# Patient Record
Sex: MALE | Race: BLACK OR AFRICAN AMERICAN | NOT HISPANIC OR LATINO | Employment: UNEMPLOYED | URBAN - METROPOLITAN AREA
[De-identification: names, ages, dates, MRNs, and addresses within clinical notes are randomized per-mention and may not be internally consistent; named-entity substitution may affect disease eponyms.]

---

## 2019-03-14 ENCOUNTER — HOSPITAL ENCOUNTER (EMERGENCY)
Facility: OTHER | Age: 24
Discharge: HOME | End: 2019-03-14
Attending: EMERGENCY MEDICINE
Payer: MEDICAID

## 2019-03-14 VITALS
TEMPERATURE: 98 F | HEART RATE: 60 BPM | OXYGEN SATURATION: 97 % | WEIGHT: 200 LBS | RESPIRATION RATE: 18 BRPM | HEIGHT: 69 IN | SYSTOLIC BLOOD PRESSURE: 121 MMHG | DIASTOLIC BLOOD PRESSURE: 56 MMHG | BODY MASS INDEX: 29.62 KG/M2

## 2019-03-14 DIAGNOSIS — S76.211A STRAIN OF GROIN, RIGHT, INITIAL ENCOUNTER: Primary | ICD-10-CM

## 2019-03-14 DIAGNOSIS — N50.819 TESTICLE PAIN: ICD-10-CM

## 2019-03-14 LAB
BILIRUB UR QL STRIP: NEGATIVE
CLARITY UR: ABNORMAL
COLOR UR: YELLOW
GLUCOSE UR QL STRIP: NEGATIVE
HGB UR QL STRIP: NEGATIVE
KETONES UR QL STRIP: NEGATIVE
LEUKOCYTE ESTERASE UR QL STRIP: NEGATIVE
NITRITE UR QL STRIP: NEGATIVE
PH UR STRIP: 6 [PH] (ref 5–8)
PROT UR QL STRIP: NEGATIVE
SP GR UR STRIP: 1.02 (ref 1–1.03)
URN SPEC COLLECT METH UR: ABNORMAL
UROBILINOGEN UR STRIP-ACNC: NEGATIVE EU/DL

## 2019-03-14 PROCEDURE — 99284 EMERGENCY DEPT VISIT MOD MDM: CPT

## 2019-03-14 PROCEDURE — 81003 URINALYSIS AUTO W/O SCOPE: CPT

## 2019-03-14 RX ORDER — METHOCARBAMOL 500 MG/1
1000 TABLET, FILM COATED ORAL 3 TIMES DAILY PRN
Qty: 20 TABLET | Refills: 0 | Status: SHIPPED | OUTPATIENT
Start: 2019-03-14 | End: 2019-03-19

## 2019-03-14 RX ORDER — IBUPROFEN 800 MG/1
800 TABLET ORAL EVERY 6 HOURS PRN
Qty: 30 TABLET | Refills: 0 | Status: SHIPPED | OUTPATIENT
Start: 2019-03-14

## 2019-03-15 NOTE — ED TRIAGE NOTES
PT w/ right testicle pain x 2 days, denies recent injury, denies discharge from penis. Pt is AAOx 3, answers questions appropriately

## 2019-03-15 NOTE — ED PROVIDER NOTES
Encounter Date: 3/14/2019    SCRIBE #1 NOTE: I, Leon Garcia, am scribing for, and in the presence of, Dr. Burnham.       History     Chief Complaint   Patient presents with    Testicle Pain     c/o right testicle pain radiating to right groin to right lower extremity x2 days pta, denies urinary symptoms or penile discharge     Time seen by provider: 9:39 PM    This is a 23 y.o. male who presents with complaint of right groin pain that began approximately two days ago. The patient reports that he initially thought that he pulled a muscle near his right hit while at work. He reports that the pain is now sharp and it radiates to his groin, when he takes a step. Tonight he reports that he had an episode of nausea, vomiting, and lightheadedness that all came on in a wave. He reports that these symptom have alleviated. He denies fever, sore throat, chest pain, shortness of breath, abdominal pain, dysuria, and discharge.       The history is provided by the patient.     Review of patient's allergies indicates:  No Known Allergies  History reviewed. No pertinent past medical history.  History reviewed. No pertinent surgical history.  Family History   Problem Relation Age of Onset    Ovarian cancer Maternal Grandmother      Social History     Tobacco Use    Smoking status: Never Smoker   Substance Use Topics    Alcohol use: Yes    Drug use: No     Review of Systems   Constitutional: Negative for fever.   HENT: Negative for sore throat.    Respiratory: Negative for shortness of breath.    Cardiovascular: Negative for chest pain.   Gastrointestinal: Positive for nausea and vomiting. Negative for abdominal pain.   Genitourinary: Negative for dysuria.        Positive for right groin pain.    Musculoskeletal: Negative for back pain.   Skin: Negative for rash.   Neurological: Positive for light-headedness. Negative for weakness.   Hematological: Does not bruise/bleed easily.       Physical Exam     Initial Vitals [03/14/19  2035]   BP Pulse Resp Temp SpO2   (!) 121/56 60 18 98 °F (36.7 °C) 97 %      MAP       --         Physical Exam    Nursing note and vitals reviewed.  Constitutional: He appears well-developed and well-nourished. He is not diaphoretic. No distress.   HENT:   Head: Normocephalic and atraumatic.   Pulmonary/Chest: No respiratory distress.   Abdominal: There is no tenderness. There is no rebound and no guarding. Hernia confirmed negative in the right inguinal area and confirmed negative in the left inguinal area.   Genitourinary: Testes normal and penis normal. No discharge found.   Genitourinary Comments: Normal male genitalia without lesions or discharge. Scrotum and testes are normal. No inguinal hernia or masses. Tenderness along the right femoral crease that is exacerbated by palpation and movement.    Musculoskeletal: Normal range of motion. He exhibits no edema or tenderness.   Neurological: He is alert and oriented to person, place, and time.   Skin: Skin is warm and dry. No rash and no abscess noted. No erythema. No pallor.   Psychiatric: He has a normal mood and affect. His behavior is normal. Judgment and thought content normal.         ED Course   Procedures  Labs Reviewed   URINALYSIS - Abnormal; Notable for the following components:       Result Value    Appearance, UA Hazy (*)     All other components within normal limits          Imaging Results          US Scrotum And Testicles (Final result)  Result time 03/14/19 21:32:52    Final result by Pablito Hawthorne MD (03/14/19 21:32:52)                 Impression:      Small bilateral varicoceles.  No evidence of testicular torsion.      Electronically signed by: Pablito Hawthorne MD  Date:    03/14/2019  Time:    21:32             Narrative:    EXAMINATION:  US SCROTUM AND TESTICLES    CLINICAL HISTORY:  Testicular pain, unspecified    TECHNIQUE:  Sonography of the scrotum and testes.    COMPARISON:  None.    FINDINGS:  Right Testicle:    *Size: 4.4 x 2.1 x  2.7 cm  *Appearance: Normal.  *Flow: Normal arterial and venous flow  *Epididymis: Normal.  *Hydrocele: None.  *Varicocele: Small measuring 3.3 mm  .    Left Testicle:    *Size: 4.5 x 2.0 x 3.2 cm  *Appearance: Normal.  *Flow: Normal arterial and venous flow  *Epididymis: Normal.  *Hydrocele: None.  *Varicocele: Small measuring 3.2 mm  .    Other findings: None.                                 Medical Decision Making:   Clinical Tests:   Lab Tests: Ordered and Reviewed  Radiological Study: Reviewed            Scribe Attestation:   Scribe #1: I performed the above scribed service and the documentation accurately describes the services I performed. I attest to the accuracy of the note.    Attending Attestation:           Physician Attestation for Scribe:  Physician Attestation Statement for Scribe #1: I, Dr. Bunrham, reviewed documentation, as scribed by Leon Garcia in my presence, and it is both accurate and complete.           Patient presents with pain right groin, rating the testicle.  No trauma. No dysuria or other genitourinary complaints. On my exam, normal genitalia without evidence of hernia.  Ultrasound shows no significant pathology of the testicles, nor obvious hernia.  On my exam he has tenderness in the femoral crease, possibly related to strain of groin muscles.  Treat with anti-inflammatory muscle relaxant.  Return precautions discussed.  Encouraged follow-up with primary care, especially symptoms persist.    \             Clinical Impression:     1. Strain of groin, right, initial encounter    2. Testicle pain                                   Twan Burnham II, MD  03/15/19 [id number]

## 2021-07-01 ENCOUNTER — PATIENT MESSAGE (OUTPATIENT)
Dept: ADMINISTRATIVE | Facility: OTHER | Age: 26
End: 2021-07-01